# Patient Record
Sex: MALE | Race: WHITE | Employment: FULL TIME | URBAN - METROPOLITAN AREA
[De-identification: names, ages, dates, MRNs, and addresses within clinical notes are randomized per-mention and may not be internally consistent; named-entity substitution may affect disease eponyms.]

---

## 2020-11-04 ENCOUNTER — HOSPITAL ENCOUNTER (INPATIENT)
Age: 45
LOS: 1 days | Discharge: HOME | DRG: 177 | End: 2020-11-05
Attending: EMERGENCY MEDICINE | Admitting: INTERNAL MEDICINE
Payer: COMMERCIAL

## 2020-11-04 ENCOUNTER — APPOINTMENT (OUTPATIENT)
Dept: GENERAL RADIOLOGY | Age: 45
DRG: 177 | End: 2020-11-04
Payer: COMMERCIAL

## 2020-11-04 PROBLEM — R51.9 HEADACHE: Status: ACTIVE | Noted: 2020-11-04

## 2020-11-04 PROBLEM — Z90.5 ACQUIRED SOLITARY KIDNEY: Status: ACTIVE | Noted: 2020-11-04

## 2020-11-04 PROBLEM — R19.7 DIARRHEA: Status: ACTIVE | Noted: 2020-11-04

## 2020-11-04 PROBLEM — U07.1 COVID-19 VIRUS INFECTION: Status: ACTIVE | Noted: 2020-11-04

## 2020-11-04 PROBLEM — R11.2 NAUSEA & VOMITING: Status: ACTIVE | Noted: 2020-11-04

## 2020-11-04 PROBLEM — Z90.5 SOLITARY KIDNEY, ACQUIRED: Status: ACTIVE | Noted: 2020-11-04

## 2020-11-04 PROBLEM — N30.00 ACUTE CYSTITIS WITHOUT HEMATURIA: Status: ACTIVE | Noted: 2020-11-04

## 2020-11-04 PROBLEM — Q60.0 SOLITARY KIDNEY, CONGENITAL: Status: ACTIVE | Noted: 2020-11-04

## 2020-11-04 PROBLEM — R06.02 SHORTNESS OF BREATH: Status: ACTIVE | Noted: 2020-11-04

## 2020-11-04 PROBLEM — R05.9 COUGH: Status: ACTIVE | Noted: 2020-11-04

## 2020-11-04 PROBLEM — E11.9 NEW ONSET TYPE 2 DIABETES MELLITUS (HCC): Status: ACTIVE | Noted: 2020-11-04

## 2020-11-04 LAB
ABO/RH: NORMAL
ANION GAP SERPL CALCULATED.3IONS-SCNC: 15 MMOL/L (ref 3–16)
ANTIBODY SCREEN: NORMAL
APTT: 31.5 SEC (ref 24.2–36.2)
APTT: 32.6 SEC (ref 24.2–36.2)
BACTERIA: ABNORMAL /HPF
BASOPHILS ABSOLUTE: 0 K/UL (ref 0–0.2)
BASOPHILS RELATIVE PERCENT: 0.4 %
BILIRUBIN URINE: NEGATIVE
BLOOD, URINE: ABNORMAL
BUN BLDV-MCNC: 15 MG/DL (ref 7–20)
C-REACTIVE PROTEIN: 111.2 MG/L (ref 0–5.1)
CALCIUM SERPL-MCNC: 9.1 MG/DL (ref 8.3–10.6)
CHLORIDE BLD-SCNC: 93 MMOL/L (ref 99–110)
CLARITY: ABNORMAL
CO2: 21 MMOL/L (ref 21–32)
COLOR: YELLOW
CREAT SERPL-MCNC: 1.1 MG/DL (ref 0.9–1.3)
D DIMER: 215 NG/ML DDU (ref 0–229)
D DIMER: <200 NG/ML DDU (ref 0–229)
EOSINOPHILS ABSOLUTE: 0 K/UL (ref 0–0.6)
EOSINOPHILS RELATIVE PERCENT: 0.4 %
EPITHELIAL CELLS, UA: 0 /HPF (ref 0–5)
FERRITIN: 1414 NG/ML (ref 30–400)
FIBRINOGEN: 764 MG/DL (ref 200–397)
FIBRINOGEN: 764 MG/DL (ref 200–397)
GFR AFRICAN AMERICAN: >60
GFR NON-AFRICAN AMERICAN: >60
GLUCOSE BLD-MCNC: 329 MG/DL (ref 70–99)
GLUCOSE BLD-MCNC: 396 MG/DL (ref 70–99)
GLUCOSE BLD-MCNC: 430 MG/DL (ref 70–99)
GLUCOSE BLD-MCNC: 531 MG/DL (ref 70–99)
GLUCOSE URINE: >=1000 MG/DL
HCT VFR BLD CALC: 41.5 % (ref 40.5–52.5)
HEMOGLOBIN: 14.3 G/DL (ref 13.5–17.5)
HYALINE CASTS: 4 /LPF (ref 0–8)
INR BLD: 0.99 (ref 0.86–1.14)
INR BLD: 1 (ref 0.86–1.14)
KETONES, URINE: 15 MG/DL
LACTATE DEHYDROGENASE: 253 U/L (ref 100–190)
LACTIC ACID: 1.7 MMOL/L (ref 0.4–2)
LEUKOCYTE ESTERASE, URINE: NEGATIVE
LYMPHOCYTES ABSOLUTE: 0.7 K/UL (ref 1–5.1)
LYMPHOCYTES RELATIVE PERCENT: 22.4 %
MCH RBC QN AUTO: 31.4 PG (ref 26–34)
MCHC RBC AUTO-ENTMCNC: 34.5 G/DL (ref 31–36)
MCV RBC AUTO: 90.9 FL (ref 80–100)
MICROSCOPIC EXAMINATION: YES
MONOCYTES ABSOLUTE: 0.3 K/UL (ref 0–1.3)
MONOCYTES RELATIVE PERCENT: 8.4 %
NEUTROPHILS ABSOLUTE: 2.2 K/UL (ref 1.7–7.7)
NEUTROPHILS RELATIVE PERCENT: 68.4 %
NITRITE, URINE: POSITIVE
PDW BLD-RTO: 13.5 % (ref 12.4–15.4)
PERFORMED ON: ABNORMAL
PH UA: 5.5 (ref 5–8)
PLATELET # BLD: 95 K/UL (ref 135–450)
PLATELET SLIDE REVIEW: ABNORMAL
PMV BLD AUTO: 9.9 FL (ref 5–10.5)
POTASSIUM REFLEX MAGNESIUM: 4.5 MMOL/L (ref 3.5–5.1)
PROCALCITONIN: 0.13 NG/ML (ref 0–0.15)
PROTEIN UA: 100 MG/DL
PROTHROMBIN TIME: 11.5 SEC (ref 10–13.2)
PROTHROMBIN TIME: 11.6 SEC (ref 10–13.2)
RBC # BLD: 4.57 M/UL (ref 4.2–5.9)
RBC UA: 2 /HPF (ref 0–4)
SARS-COV-2, NAAT: DETECTED
SLIDE REVIEW: ABNORMAL
SODIUM BLD-SCNC: 129 MMOL/L (ref 136–145)
SPECIFIC GRAVITY UA: >1.03 (ref 1–1.03)
TROPONIN: <0.01 NG/ML
URINE TYPE: ABNORMAL
UROBILINOGEN, URINE: 0.2 E.U./DL
WBC # BLD: 3.1 K/UL (ref 4–11)
WBC UA: 99 /HPF (ref 0–5)

## 2020-11-04 PROCEDURE — 80048 BASIC METABOLIC PNL TOTAL CA: CPT

## 2020-11-04 PROCEDURE — 85025 COMPLETE CBC W/AUTO DIFF WBC: CPT

## 2020-11-04 PROCEDURE — 83036 HEMOGLOBIN GLYCOSYLATED A1C: CPT

## 2020-11-04 PROCEDURE — 2500000003 HC RX 250 WO HCPCS: Performed by: INTERNAL MEDICINE

## 2020-11-04 PROCEDURE — 84484 ASSAY OF TROPONIN QUANT: CPT

## 2020-11-04 PROCEDURE — 86900 BLOOD TYPING SEROLOGIC ABO: CPT

## 2020-11-04 PROCEDURE — 2580000003 HC RX 258: Performed by: EMERGENCY MEDICINE

## 2020-11-04 PROCEDURE — 1200000000 HC SEMI PRIVATE

## 2020-11-04 PROCEDURE — 82728 ASSAY OF FERRITIN: CPT

## 2020-11-04 PROCEDURE — 2580000003 HC RX 258: Performed by: INTERNAL MEDICINE

## 2020-11-04 PROCEDURE — 85384 FIBRINOGEN ACTIVITY: CPT

## 2020-11-04 PROCEDURE — 86901 BLOOD TYPING SEROLOGIC RH(D): CPT

## 2020-11-04 PROCEDURE — 85610 PROTHROMBIN TIME: CPT

## 2020-11-04 PROCEDURE — 85730 THROMBOPLASTIN TIME PARTIAL: CPT

## 2020-11-04 PROCEDURE — 6370000000 HC RX 637 (ALT 250 FOR IP): Performed by: INTERNAL MEDICINE

## 2020-11-04 PROCEDURE — 71045 X-RAY EXAM CHEST 1 VIEW: CPT

## 2020-11-04 PROCEDURE — U0002 COVID-19 LAB TEST NON-CDC: HCPCS

## 2020-11-04 PROCEDURE — 86850 RBC ANTIBODY SCREEN: CPT

## 2020-11-04 PROCEDURE — 86140 C-REACTIVE PROTEIN: CPT

## 2020-11-04 PROCEDURE — 6360000002 HC RX W HCPCS: Performed by: EMERGENCY MEDICINE

## 2020-11-04 PROCEDURE — 83605 ASSAY OF LACTIC ACID: CPT

## 2020-11-04 PROCEDURE — 6360000002 HC RX W HCPCS: Performed by: INTERNAL MEDICINE

## 2020-11-04 PROCEDURE — 87040 BLOOD CULTURE FOR BACTERIA: CPT

## 2020-11-04 PROCEDURE — 99285 EMERGENCY DEPT VISIT HI MDM: CPT

## 2020-11-04 PROCEDURE — 84145 PROCALCITONIN (PCT): CPT

## 2020-11-04 PROCEDURE — 96374 THER/PROPH/DIAG INJ IV PUSH: CPT

## 2020-11-04 PROCEDURE — 85379 FIBRIN DEGRADATION QUANT: CPT

## 2020-11-04 PROCEDURE — 81001 URINALYSIS AUTO W/SCOPE: CPT

## 2020-11-04 PROCEDURE — 36415 COLL VENOUS BLD VENIPUNCTURE: CPT

## 2020-11-04 PROCEDURE — 83615 LACTATE (LD) (LDH) ENZYME: CPT

## 2020-11-04 PROCEDURE — 6370000000 HC RX 637 (ALT 250 FOR IP): Performed by: EMERGENCY MEDICINE

## 2020-11-04 RX ORDER — ALBUTEROL SULFATE 90 UG/1
2 AEROSOL, METERED RESPIRATORY (INHALATION) EVERY 6 HOURS PRN
Status: DISCONTINUED | OUTPATIENT
Start: 2020-11-04 | End: 2020-11-05 | Stop reason: HOSPADM

## 2020-11-04 RX ORDER — ASCORBIC ACID 500 MG
1000 TABLET ORAL 3 TIMES DAILY
Status: DISCONTINUED | OUTPATIENT
Start: 2020-11-04 | End: 2020-11-05 | Stop reason: HOSPADM

## 2020-11-04 RX ORDER — SODIUM CHLORIDE 9 MG/ML
INJECTION, SOLUTION INTRAVENOUS CONTINUOUS
Status: DISCONTINUED | OUTPATIENT
Start: 2020-11-04 | End: 2020-11-05 | Stop reason: HOSPADM

## 2020-11-04 RX ORDER — POLYETHYLENE GLYCOL 3350 17 G/17G
17 POWDER, FOR SOLUTION ORAL DAILY PRN
Status: DISCONTINUED | OUTPATIENT
Start: 2020-11-04 | End: 2020-11-05 | Stop reason: HOSPADM

## 2020-11-04 RX ORDER — ZINC SULFATE 50(220)MG
50 CAPSULE ORAL DAILY
Status: DISCONTINUED | OUTPATIENT
Start: 2020-11-04 | End: 2020-11-05 | Stop reason: HOSPADM

## 2020-11-04 RX ORDER — BENZONATATE 100 MG/1
100 CAPSULE ORAL ONCE
Status: COMPLETED | OUTPATIENT
Start: 2020-11-04 | End: 2020-11-04

## 2020-11-04 RX ORDER — ASPIRIN 325 MG
325 TABLET ORAL ONCE
Status: COMPLETED | OUTPATIENT
Start: 2020-11-04 | End: 2020-11-04

## 2020-11-04 RX ORDER — DEXAMETHASONE SODIUM PHOSPHATE 10 MG/ML
10 INJECTION, SOLUTION INTRAMUSCULAR; INTRAVENOUS ONCE
Status: COMPLETED | OUTPATIENT
Start: 2020-11-04 | End: 2020-11-04

## 2020-11-04 RX ORDER — DEXTROSE MONOHYDRATE 25 G/50ML
12.5 INJECTION, SOLUTION INTRAVENOUS PRN
Status: DISCONTINUED | OUTPATIENT
Start: 2020-11-04 | End: 2020-11-05 | Stop reason: HOSPADM

## 2020-11-04 RX ORDER — ACETAMINOPHEN 650 MG/1
650 SUPPOSITORY RECTAL EVERY 4 HOURS PRN
Status: DISCONTINUED | OUTPATIENT
Start: 2020-11-04 | End: 2020-11-05 | Stop reason: HOSPADM

## 2020-11-04 RX ORDER — SODIUM CHLORIDE, SODIUM LACTATE, POTASSIUM CHLORIDE, AND CALCIUM CHLORIDE .6; .31; .03; .02 G/100ML; G/100ML; G/100ML; G/100ML
1000 INJECTION, SOLUTION INTRAVENOUS ONCE
Status: COMPLETED | OUTPATIENT
Start: 2020-11-04 | End: 2020-11-04

## 2020-11-04 RX ORDER — SODIUM CHLORIDE 0.9 % (FLUSH) 0.9 %
10 SYRINGE (ML) INJECTION PRN
Status: DISCONTINUED | OUTPATIENT
Start: 2020-11-04 | End: 2020-11-05 | Stop reason: HOSPADM

## 2020-11-04 RX ORDER — NICOTINE POLACRILEX 4 MG
15 LOZENGE BUCCAL PRN
Status: DISCONTINUED | OUTPATIENT
Start: 2020-11-04 | End: 2020-11-05 | Stop reason: HOSPADM

## 2020-11-04 RX ORDER — SODIUM CHLORIDE 0.9 % (FLUSH) 0.9 %
10 SYRINGE (ML) INJECTION EVERY 12 HOURS SCHEDULED
Status: DISCONTINUED | OUTPATIENT
Start: 2020-11-04 | End: 2020-11-05 | Stop reason: HOSPADM

## 2020-11-04 RX ORDER — ACETAMINOPHEN 325 MG/1
650 TABLET ORAL EVERY 4 HOURS PRN
Status: DISCONTINUED | OUTPATIENT
Start: 2020-11-04 | End: 2020-11-05 | Stop reason: HOSPADM

## 2020-11-04 RX ORDER — ONDANSETRON 2 MG/ML
4 INJECTION INTRAMUSCULAR; INTRAVENOUS EVERY 4 HOURS PRN
Status: DISCONTINUED | OUTPATIENT
Start: 2020-11-04 | End: 2020-11-05 | Stop reason: HOSPADM

## 2020-11-04 RX ORDER — THIAMINE HYDROCHLORIDE 100 MG/ML
200 INJECTION, SOLUTION INTRAMUSCULAR; INTRAVENOUS DAILY
Status: DISCONTINUED | OUTPATIENT
Start: 2020-11-04 | End: 2020-11-04 | Stop reason: SDUPTHER

## 2020-11-04 RX ORDER — BENZONATATE 100 MG/1
200 CAPSULE ORAL 3 TIMES DAILY PRN
Status: DISCONTINUED | OUTPATIENT
Start: 2020-11-04 | End: 2020-11-05 | Stop reason: HOSPADM

## 2020-11-04 RX ORDER — DEXTROSE MONOHYDRATE 50 MG/ML
100 INJECTION, SOLUTION INTRAVENOUS PRN
Status: DISCONTINUED | OUTPATIENT
Start: 2020-11-04 | End: 2020-11-05 | Stop reason: HOSPADM

## 2020-11-04 RX ADMIN — ASPIRIN 325 MG ORAL TABLET 325 MG: 325 PILL ORAL at 10:17

## 2020-11-04 RX ADMIN — INSULIN LISPRO 6 UNITS: 100 INJECTION, SOLUTION INTRAVENOUS; SUBCUTANEOUS at 22:17

## 2020-11-04 RX ADMIN — BENZONATATE 100 MG: 100 CAPSULE ORAL at 10:17

## 2020-11-04 RX ADMIN — BENZONATATE 200 MG: 100 CAPSULE ORAL at 22:29

## 2020-11-04 RX ADMIN — OXYCODONE HYDROCHLORIDE AND ACETAMINOPHEN 1000 MG: 500 TABLET ORAL at 13:20

## 2020-11-04 RX ADMIN — FAMOTIDINE 10 MG: 10 INJECTION, SOLUTION INTRAVENOUS at 22:30

## 2020-11-04 RX ADMIN — THIAMINE HYDROCHLORIDE 200 MG: 100 INJECTION, SOLUTION INTRAMUSCULAR; INTRAVENOUS at 13:19

## 2020-11-04 RX ADMIN — ZINC SULFATE 220 MG (50 MG) CAPSULE 50 MG: CAPSULE at 13:20

## 2020-11-04 RX ADMIN — OXYCODONE HYDROCHLORIDE AND ACETAMINOPHEN 1000 MG: 500 TABLET ORAL at 22:30

## 2020-11-04 RX ADMIN — SODIUM CHLORIDE, POTASSIUM CHLORIDE, SODIUM LACTATE AND CALCIUM CHLORIDE 1000 ML: 600; 310; 30; 20 INJECTION, SOLUTION INTRAVENOUS at 10:45

## 2020-11-04 RX ADMIN — DEXAMETHASONE SODIUM PHOSPHATE 10 MG: 10 INJECTION, SOLUTION INTRAMUSCULAR; INTRAVENOUS at 10:17

## 2020-11-04 RX ADMIN — INSULIN LISPRO 8 UNITS: 100 INJECTION, SOLUTION INTRAVENOUS; SUBCUTANEOUS at 14:23

## 2020-11-04 RX ADMIN — SODIUM CHLORIDE: 9 INJECTION, SOLUTION INTRAVENOUS at 14:19

## 2020-11-04 RX ADMIN — ENOXAPARIN SODIUM 30 MG: 30 INJECTION SUBCUTANEOUS at 22:29

## 2020-11-04 RX ADMIN — ACETAMINOPHEN 650 MG: 325 TABLET ORAL at 22:30

## 2020-11-04 RX ADMIN — ACETAMINOPHEN 650 MG: 325 TABLET ORAL at 13:20

## 2020-11-04 RX ADMIN — SODIUM CHLORIDE: 9 INJECTION, SOLUTION INTRAVENOUS at 22:35

## 2020-11-04 RX ADMIN — INSULIN LISPRO 12 UNITS: 100 INJECTION, SOLUTION INTRAVENOUS; SUBCUTANEOUS at 18:04

## 2020-11-04 RX ADMIN — FAMOTIDINE 10 MG: 10 INJECTION, SOLUTION INTRAVENOUS at 13:19

## 2020-11-04 RX ADMIN — ENOXAPARIN SODIUM 30 MG: 30 INJECTION SUBCUTANEOUS at 13:20

## 2020-11-04 RX ADMIN — CEFTRIAXONE 1 G: 1 INJECTION, POWDER, FOR SOLUTION INTRAMUSCULAR; INTRAVENOUS at 12:13

## 2020-11-04 ASSESSMENT — PAIN DESCRIPTION - PAIN TYPE
TYPE: ACUTE PAIN

## 2020-11-04 ASSESSMENT — PAIN SCALES - GENERAL
PAINLEVEL_OUTOF10: 10
PAINLEVEL_OUTOF10: 5
PAINLEVEL_OUTOF10: 5

## 2020-11-04 ASSESSMENT — PAIN DESCRIPTION - DESCRIPTORS
DESCRIPTORS: ACHING

## 2020-11-04 ASSESSMENT — PAIN DESCRIPTION - LOCATION
LOCATION: HEAD

## 2020-11-04 ASSESSMENT — PAIN DESCRIPTION - FREQUENCY: FREQUENCY: CONTINUOUS

## 2020-11-04 NOTE — H&P
Hospital Medicine  History and Physical    PCP: No primary care provider on file. Patient Name: Sol Rapp    Date of Service: Pt seen/examined on 11/04/2020 and admitted to Inpatient with expected LOS greater than two midnights due to medical therapy    CHIEF COMPLAINT:  Pt c/o cough, headache, dizziness, fevers, nausea, vomiting and diarrhea  HISTORY OF PRESENT ILLNESS: Pt is an 41y.o. year-old male who is generally in good health. He has a solitary kidney due to having a kidney removed due to a solid mass as a child. He was on a recent camping trip and states that several other people on the trip contracted COVID-19. Resents to the emergency room for evaluation following a 9-day history of shortness of breath and a productive cough. Also reports nausea, vomiting, diarrhea, fevers and myalgias. In the emergency room he was found to have COVID-19 and new onset Diabetes Mellitus. He is being admitted for further evaluation and treatment. Associated signs and symptoms do not include hemoptysis, hematochezia, constipation or urinary symptoms. Past Medical History:        Diagnosis Date    Hypertension        Past Surgical History:        Procedure Laterality Date    TURP         Allergies:  Patient has no known allergies. Medications Prior to Admission:    Prior to Admission medications    Not on File       Family History:   Family history is negative for accelerated coronary artery disease, diabetes or malignancies. Social History:   TOBACCO:   does not have a smoking history on file. His smokeless tobacco use includes chew. ETOH:   reports current alcohol use.   OCCUPATION:      REVIEW OF SYSTEMS:  A full review of systems was performed and is negative except for that which appears in the HPI    Physical Exam:    Vitals: /89   Pulse 79   Temp 98.7 °F (37.1 °C) (Oral)   Resp 25   Ht 6' 1\" (1.854 m)   Wt 245 lb (111.1 kg)   SpO2 97%   BMI 32.32 kg/m²   General appearance: WD/WN 45 y.o. year-old  male who is alert, appears stated age and is cooperative  HEENT: Head: Normocephalic, no lesions, without obvious abnormality. Eye: Normal external eye, conjunctiva, lids cornea, PEERL. Ears: Normal external ears. Non-tender. Nose: Normal external nose, mucus membranes and septum. Pharynx: Dental Hygiene adequate. Normal buccal mucosa. Normal pharynx. Neck: no adenopathy, no carotid bruit, no JVD, supple, symmetrical, trachea midline and thyroid not enlarged, symmetric, no tenderness/mass/nodules  Lungs: clear to auscultation bilaterally and no use of accessory muscles. Heart: regular rate and rhythm, S1, S2 normal, no murmur, click, rub or gallop and normal apical impulse  Abdomen: soft, non-tender; bowel sounds normal; no masses, no organomegaly  Extremities: extremities atraumatic, no cyanosis or edema and Homans sign is negative, no sign of DVT. Capillary Refill: Acceptable < 3 seconds   Peripheral Pulses: +3 easily felt, not easily obliterated with pressures   Skin: Skin color, texture, turgor normal. No rashes or lesions on exposed skin  Neurologic: Neurovascularly intact without any focal sensory/motor deficits. Cranial nerves: II-XII intact, grossly non-focal. Gait was not tested. Psychiatric: Alert and oriented, thought content appropriate, normal insight    CBC:   Recent Labs     11/04/20  0954   WBC 3.1*   HGB 14.3   PLT 95*     BMP:    Recent Labs     11/04/20  0954   *   K 4.5   CL 93*   CO2 21   BUN 15   CREATININE 1.1   GLUCOSE 396*     Troponin: No results for input(s): TROPONINI in the last 72 hours.   PT/INR:  No results found for: PTINR  U/A:    Lab Results   Component Value Date    LEUKOCYTESUR Negative 11/04/2020    RBCUA 2 11/04/2020    SPECGRAV >1.030 11/04/2020    UROBILINOGEN 0.2 11/04/2020    BILIRUBINUR Negative 11/04/2020    BLOODU TRACE 11/04/2020    GLUCOSEU >=1000 11/04/2020    PROTEINU 100 11/04/2020         RAD:   I have independently reviewed and interpreted the imaging studies below and based my recommendations to the patient on those findings. Xr Chest Portable    Result Date: 11/4/2020  EXAMINATION: ONE XRAY VIEW OF THE CHEST 11/4/2020 9:34 am COMPARISON: None. HISTORY: ORDERING SYSTEM PROVIDED HISTORY: sob TECHNOLOGIST PROVIDED HISTORY: Reason for exam:->sob Reason for Exam: Shortness of Breath Acuity: Acute Type of Exam: Initial FINDINGS: Ill-defined faint left mid to lower lung pulmonary disease is present. No evidence of pneumothorax or pleural effusion. No evidence of acute process of the cardiac or mediastinal structures     Left-sided airspace disease suspicious for pneumonia. Assessment:   Principal Problem:    COVID-19 virus infection  Active Problems:    New onset type 2 diabetes mellitus (HCC)    Acute cystitis without hematuria    Nausea & vomiting    Diarrhea    Cough    Headache    Solitary kidney, acquired    Shortness of breath  Resolved Problems:    * No resolved hospital problems. *      Plan:     Pneumonia due to 2019 novel coronavirus  - Blood cultures x 2 ordered  - Respiratory culture ordered  - Legionella pneumophilia and Strep pneumoniae urine antigens ordered  - Procalcitonin ordered  - Decadron not continued as patient is not hypoxic  - COVID19 dosed Lovenox started  - Zinc sulfate 50 mg bid ordered  - Vitamin D (Cholecalciferol) 2000 units daily ordered  - Vitamin C 1000 mg tid ordered  - Thiamine 200 mg IV daily ordered  - Pepcid 10 mg IV bid  - Lovenox bid ordered     Cough, Shortness of breath (without hypoxia) - will provide antitussive medications as needed. Acute cystitis without significant hematuria - patient was started on Rocephin empirically. Urine culture and sensitivities have been ordered, and antibiotic therapy will be adjusted as necessary based upon those results.      Non-Intractable vomiting with nausea - Will provide symptomatic treatment with Zofran and/or Phenergan as needed, maintenance of fluids and electrolytes. Diarrhea - Stool studes ordered to include bacterial pathogens via PCR. Will monitor and provide supportive care. Headache - provide symptomatic treatment    Solitary kidney, acquired      Diabetes mellitus, new onset ([facility] [address] - HgbA1c ordered; Start Sliding Scale insulin, and patient will be placed on a Carb Control Diet. Monitor glucose qAc and qHs, or q4 hours when NPO. The Diabetic Educator will be consulted. DVT Prophylaxis: Lovenox  Diet: DIET CARB CONTROL;  Code Status: No Order  (Advanced care planning has been discussed with patient and/or responsible family member and is reflected in the code status.  Further orders associated with this have been entered if appropriate)    Disposition: Anticipate that patient will remain in the hospital for 2 to 3 days depending on further evaluation and clinical course    Please note that over 50 minutes was spent in evaluating the patient, review of records and results, discussion with staff/family, etc.    India Ryan MD

## 2020-11-04 NOTE — ED PROVIDER NOTES
Emergency Department Encounter    Patient: Marlene Spangler  MRN: [id number]  : 1975  Date of Evaluation: 2020  ED Provider:  Danielle Berg    Triage Chief Complaint:   Shortness of Breath (x 9 days. Productive yellow cough, headache, dizziness, fevers at home, nausea, diarrhea. Denies vomiting.)    [place]:  Marlene Spangler is a 41 y.o. male that presents the ER for evaluation positive cough congestion shortness of breath and productive sputum. He has a solitary kidney due to prior history of nephrectomy due to mass lesion as a child. Multiple sick contacts. Positive cough congestion fevers myalgia and diarrhea. No abdominal pain. Mild headache. Positive cough congestion shortness of breath. Concern for possible COVID-19. ROS - see HPI, below listed is current ROS at time of my eval:  General:  No fevers, no chills  Eyes:  No recent vison changes, no discharge  ENT:  No sore throat, no nasal congestion, no hearing changes  Cardiovascular:  No chest pain, no palpitations  Respiratory:  + shortness of breath, + cough, + wheezing  Gastrointestinal:  No pain, no nausea, no vomiting, no diarrhea  Musculoskeletal:  No muscle pain, no joint pain  Skin:  No rash, no pruritis  Neurologic:  No speech problems, no headache  Psychiatric:  No anxiety  Genitourinary:  No dysuria, no hematuria  Endocrine:  No unexpected weight gain, no unexpected weight loss  Extremities:  no edema, no pain    Past Medical History:   Diagnosis Date    Hypertension      Past Surgical History:   Procedure Laterality Date    TURP       History reviewed. No pertinent family history.   Social History     Socioeconomic History    Marital status:      Spouse name: Not on file    Number of children: Not on file    Years of education: Not on file    Highest education level: Not on file   Occupational History    Not on file   Social Needs    Financial resource strain: Not on file    Food insecurity     Worry: Not on file     Inability: Not on file    Transportation needs     Medical: Not on file     Non-medical: Not on file   Tobacco Use    Smoking status: Never Smoker    Smokeless tobacco: Current User     Types: Chew   Substance and Sexual Activity    Alcohol use: Yes     Comment: once/month    Drug use: Not on file    Sexual activity: Not on file   Lifestyle    Physical activity     Days per week: Not on file     Minutes per session: Not on file    Stress: Not on file   Relationships    Social connections     Talks on phone: Not on file     Gets together: Not on file     Attends [employer] service: Not on file     Active member of club or organization: Not on file     Attends meetings of clubs or organizations: Not on file     Relationship status: Not on file    Intimate partner violence     Fear of current or ex partner: Not on file     Emotionally abused: Not on file     Physically abused: Not on file     Forced sexual activity: Not on file   Other Topics Concern    Not on file   Social History Narrative    Not on file     Current Facility-Administered Medications   Medication Dose Route Frequency Provider Last Rate Last Dose    insulin glargine (LANTUS) injection vial 8 Units  8 Units Subcutaneous Nightly Jaden Lopez MD   8 Units at 11/05/20 1235    cefTRIAXone (ROCEPHIN) 1 g IVPB in 50 mL D5W minibag  1 g Intravenous Q24H Jaden Lopez MD   Stopped at 11/05/20 1305    sodium chloride flush 0.9 % injection 10 mL  10 mL Intravenous 2 times per day Jaden Lopez MD        sodium chloride flush 0.9 % injection 10 mL  10 mL Intravenous PRN Jaden Lopez MD        acetaminophen (TYLENOL) tablet 650 mg  650 mg Oral Q4H PRN Jaden Lopez MD   650 mg at 11/05/20 0545    Or    acetaminophen (TYLENOL) suppository 650 mg  650 mg Rectal Q4H PRN Jaden Lopez MD        polyethylene glycol [facility]) packet 17 g  17 g Oral Daily PRN Jaden Lopez MD        ondansetron [facility]) injection 4 mg  4 mg Intravenous Q4H PRN Robert Webb MD        insulin lispro (HUMALOG) injection vial 0-12 Units  0-12 Units Subcutaneous TID WC Robert Webb MD   10 Units at 11/05/20 1235    insulin lispro (HUMALOG) injection vial 0-6 Units  0-6 Units Subcutaneous Nightly Robert Webb MD   6 Units at 11/04/20 2217    glucose (GLUTOSE) 40 % oral gel 15 g  15 g Oral PRN Robert Webb MD        dextrose 50 % IV solution  12.5 g Intravenous PRN Robert Webb MD        glucagon (rDNA) injection 1 mg  1 mg Intramuscular PRN Robert Webb MD        dextrose 5 % solution  100 mL/hr Intravenous PRN Robert Webb MD        0.9 % sodium chloride infusion   Intravenous Continuous Robert Webb  mL/hr at 11/05/20 0821      enoxaparin (LOVENOX) injection 30 mg  30 mg Subcutaneous BID Robert Webb MD   30 mg at 11/05/20 0858    albuterol sulfate  (90 Base) MCG/ACT inhaler 2 puff  2 puff Inhalation Q6H PRN Robert Webb MD        benzonatate (TESSALON) capsule 200 mg  200 mg Oral TID PRN Robert Webb MD   200 mg at 11/04/20 2229    famotidine (PEPCID) injection 10 mg  10 mg Intravenous BID Robert Webb MD   10 mg at 11/05/20 8128    vitamin C (ASCORBIC ACID) tablet 1,000 mg  1,000 mg Oral TID Robert Webb MD   1,000 mg at 11/05/20 4490    zinc sulfate (ZINCATE) capsule 50 mg  50 mg Oral Daily Robert Webb MD   50 mg at 11/05/20 5835    thiamine (B-1) 200 mg in sodium chloride 0.9 % 100 mL IVPB  200 mg Intravenous Q24H Robert Webb MD   Stopped at 11/04/20 1349     No Known Allergies    Nursing Notes Reviewed    Physical Exam:  Triage VS:    ED Triage Vitals [11/04/20 0906]   Enc Vitals Group      BP (!) 133/90      Pulse 87      Resp 16      Temp 98.7 °F (37.1 °C)      Temp Source Oral      SpO2 100 %      Weight 245 lb (111.1 kg)      Height 6' 1\" (1.854 m)      Head Circumference       Peak Flow       Pain Score       Pain Loc       Pain Edu? Excl.  in [address]? My pulse ox interpretation is - normal    General appearance:  No acute distress  Skin:  Warm. Dry. No pallor. No rash. Eye:  Normal conjuctiva. no Icterus. Ears, nose, mouth and throat:  Oral mucosa moist   Heart:  Regular rate and rhythm, normal S1 & S2, no extra heart sounds, no murmurs. Perfusion:  Within normal limits. Respiratory:  Respirations nonlabored. expiratory wheezes noted, good air entry throughout, no tachypnea     Abdominal:  Soft. Nontender. Non distended. Extremity:  No edema or tenderness  Neurological:  Alert and oriented,  No focal neuro deficits.      I have reviewed and interpreted all of the currently available lab results from this visit (if applicable):  Results for orders placed or performed during the hospital encounter of 11/04/20   COVID-19   Result Value Ref Range    SARS-CoV-2, NAAT DETECTED (A) Not Detected   Basic Metabolic Panel w/ Reflex to MG   Result Value Ref Range    Sodium 129 (L) 136 - 145 mmol/L    Potassium reflex Magnesium 4.5 3.5 - 5.1 mmol/L    Chloride 93 (L) 99 - 110 mmol/L    CO2 21 21 - 32 mmol/L    Anion Gap 15 3 - 16    Glucose 396 (H) 70 - 99 mg/dL    BUN 15 7 - 20 mg/dL    CREATININE 1.1 0.9 - 1.3 mg/dL    GFR Non-African American >60 >60    GFR African American >60 >60    Calcium 9.1 8.3 - 10.6 mg/dL   CBC Auto Differential   Result Value Ref Range    WBC 3.1 (L) 4.0 - 11.0 K/uL    RBC 4.57 4.20 - 5.90 M/uL    Hemoglobin 14.3 13.5 - 17.5 g/dL    Hematocrit 41.5 40.5 - 52.5 %    MCV 90.9 80.0 - 100.0 fL    MCH 31.4 26.0 - 34.0 pg    MCHC 34.5 31.0 - 36.0 g/dL    RDW 13.5 12.4 - 15.4 %    Platelets 95 (L) 121 - 450 K/uL    MPV 9.9 5.0 - 10.5 fL    PLATELET SLIDE REVIEW Decreased     SLIDE REVIEW see below     Neutrophils % 68.4 %    Lymphocytes % 22.4 %    Monocytes % 8.4 %    Eosinophils % 0.4 %    Basophils % 0.4 %    Neutrophils Absolute 2.2 1.7 - 7.7 K/uL    Lymphocytes Absolute 0.7 (L) 1.0 - 5.1 K/uL    Monocytes Absolute 0.3 0.0 - 1.3 K/uL    Eosinophils Absolute 0.0 0.0 - 0.6 K/uL    Basophils Absolute 0.0 0.0 - 0.2 K/uL   Urinalysis, reflex to microscopic   Result Value Ref Range    Color, UA YELLOW Straw/Yellow    Clarity, UA CLOUDY (A) Clear    Glucose, Ur >=1000 (A) Negative mg/dL    Bilirubin Urine Negative Negative    Ketones, Urine 15 (A) Negative mg/dL    Specific Gravity, UA >1.030 1.005 - 1.030    Blood, Urine TRACE (A) Negative    pH, UA 5.5 5.0 - 8.0    Protein,  (A) Negative mg/dL    Urobilinogen, Urine 0.2 <2.0 E.U./dL    Nitrite, Urine POSITIVE (A) Negative    Leukocyte Esterase, Urine Negative Negative    Microscopic Examination YES     Urine Type NotGiven    Microscopic Urinalysis   Result Value Ref Range    Bacteria, UA 4+ (A) None Seen /HPF    Hyaline Casts, UA 4 0 - 8 /LPF    WBC, UA 99 (H) 0 - 5 /HPF    RBC, UA 2 0 - 4 /HPF    Epithelial Cells, UA 0 0 - 5 /HPF   Hemoglobin A1C   Result Value Ref Range    Hemoglobin A1C 9.7 See comment %    eAG 231.7 mg/dL   Protime-INR   Result Value Ref Range    Protime 11.6 10.0 - 13.2 sec    INR 1.00 0.86 - 1.14   APTT   Result Value Ref Range    aPTT 32.6 24.2 - 36.2 sec   Fibrinogen   Result Value Ref Range    Fibrinogen 764 (H) 200 - 397 mg/dL   Procalcitonin   Result Value Ref Range    Procalcitonin 0.13 0.00 - 0.15 ng/mL   C-Reactive Protein   Result Value Ref Range    .2 (H) 0.0 - 5.1 mg/L   Lactate Dehydrogenase   Result Value Ref Range     (H) 100 - 190 U/L   Ferritin   Result Value Ref Range    Ferritin 1,414.0 (H) 30.0 - 400.0 ng/mL   D-Dimer, Quantitative   Result Value Ref Range    D-Dimer, Quant <200 0 - 229 ng/mL DDU   Troponin   Result Value Ref Range    Troponin <0.01 <0.01 ng/mL   Lactic Acid, Plasma   Result Value Ref Range    Lactic Acid 1.7 0.4 - 2.0 mmol/L   CBC auto differential   Result Value Ref Range    WBC 7.0 4.0 - 11.0 K/uL    RBC 4.48 4.20 - 5.90 M/uL    Hemoglobin 14.2 13.5 - 17.5 g/dL    Hematocrit 40.7 40.5 - 52.5 %    MCV 90.8 80.0 - 100.0 fL    MCH 31.6 26.0 - 34.0 pg    MCHC 34.8 31.0 - 36.0 g/dL    RDW 13.7 12.4 - 15.4 %    Platelets 623 (L) 297 - 450 K/uL    MPV 9.9 5.0 - 10.5 fL    Neutrophils % 83.7 %    Lymphocytes % 10.7 %    Monocytes % 5.5 %    Eosinophils % 0.0 %    Basophils % 0.1 %    Neutrophils Absolute 5.9 1.7 - 7.7 K/uL    Lymphocytes Absolute 0.8 (L) 1.0 - 5.1 K/uL    Monocytes Absolute 0.4 0.0 - 1.3 K/uL    Eosinophils Absolute 0.0 0.0 - 0.6 K/uL    Basophils Absolute 0.0 0.0 - 0.2 K/uL   Comprehensive Metabolic Panel w/ Reflex to MG   Result Value Ref Range    Sodium 130 (L) 136 - 145 mmol/L    Potassium reflex Magnesium 4.7 3.5 - 5.1 mmol/L    Chloride 96 (L) 99 - 110 mmol/L    CO2 20 (L) 21 - 32 mmol/L    Anion Gap 14 3 - 16    Glucose 360 (H) 70 - 99 mg/dL    BUN 19 7 - 20 mg/dL    CREATININE 0.9 0.9 - 1.3 mg/dL    GFR Non-African American >60 >60    GFR African American >60 >60    Calcium 9.1 8.3 - 10.6 mg/dL    Total Protein 7.3 6.4 - 8.2 g/dL    Alb 3.9 3.4 - 5.0 g/dL    Albumin/Globulin Ratio 1.1 1.1 - 2.2    Total Bilirubin 0.5 0.0 - 1.0 mg/dL    Alkaline Phosphatase 89 40 - 129 U/L    ALT 37 10 - 40 U/L    AST 29 15 - 37 U/L    Globulin 3.4 g/dL   Protime-INR   Result Value Ref Range    Protime 11.5 10.0 - 13.2 sec    INR 0.99 0.86 - 1.14   APTT   Result Value Ref Range    aPTT 31.5 24.2 - 36.2 sec   Fibrinogen   Result Value Ref Range    Fibrinogen 764 (H) 200 - 397 mg/dL   D-Dimer, Quantitative   Result Value Ref Range    D-Dimer, Quant 215 0 - 229 ng/mL DDU   POCT Glucose   Result Value Ref Range    POC Glucose 329 (H) 70 - 99 mg/dl    Performed on ACCU-CHEK    POCT Glucose   Result Value Ref Range    POC Glucose 531 (H) 70 - 99 mg/dl    Performed on ACCU-CHEK    POCT Glucose   Result Value Ref Range    POC Glucose 430 (H) 70 - 99 mg/dl    Performed on ACCU-CHEK    POCT Glucose   Result Value Ref Range    POC Glucose 370 (H) 70 - 99 mg/dl    Performed on ACCU-CHEK    POCT Glucose   Result Value Ref Range    POC obtaining history, conducting a physical exam, performing and monitoring interventions, ordering, collecting and interpreting tests, and establishing medical decision-making. There was a potential for life/limb threatening pathology requiring close evaluation and intervention with concern for patient decompensation. Clinical Impression:  1. COVID-19    2. Urinary tract infection without hematuria, site unspecified    3. New onset type 2 diabetes mellitus ([facility] [address]    4.  Solitary kidney, acquired      [facility]if applicable):    [facility]   Schedule an appointment as soon as possible for a visit in 2 weeks      Disposition medications (if applicable):  Current Discharge Medication List      START taking these medications    Details   benzonatate (TESSALON) 200 MG capsule Take 1 capsule by mouth 3 times daily as needed for Cough  Qty: 21 capsule, Refills: 0      ciprofloxacin (CIPRO) 500 MG tablet Take 1 tablet by mouth 2 times daily for 5 days  Qty: 10 tablet, Refills: 0      insulin glargine (LANTUS SOLOSTAR) 100 UNIT/ML injection pen Inject 5 Units into the skin nightly  Qty: 5 pen, Refills: 3      metFORMIN (GLUCOPHAGE) 500 MG tablet Take 1 tablet by mouth 2 times daily (with meals)  Qty: 60 tablet, Refills: 1      Cotton Balls MISC Check blood glucose level each morning before breakfast  Qty: 100 each, Refills: 0      Lancets MISC Check glucose each morning before breakfast  Qty: 100 each, Refills: 1      blood glucose monitor strips Check blood glucose level each morning before breakfast  Qty: 100 strip, Refills: 1      Insulin Pen Needle 31G X 5 MM MISC 1 each by Does not apply route daily  Qty: 100 each, Refills: 3      Blood Glucose Monitoring Suppl (AGAMATRIX PRESTO) w/Device KIT 1 each by Does not apply route daily (with breakfast)  Qty: 1 kit, Refills: 0             Comment: Please note this report has been produced using speech recognition software and may contain errors related to that system including errors in grammar, punctuation, and spelling, as well as words and phrases that may be inappropriate. Efforts were made to edit the dictations.      Patrick Christopher MD  [id number]/[id number]

## 2020-11-04 NOTE — PROGRESS NOTES
Pt arrived into 4253 from ED. Pt A&O x4. VSS, RA. Fall precautions in place. Call light in reach.  Electronically signed by Tia Parham RN on 11/4/2020 at 4:44 PM

## 2020-11-04 NOTE — ED TRIAGE NOTES
Patient to ED via private vehicle for generalized body aches, dry cough, nausea/diarrhea, and headache. Symptoms started on 10/26, patient was at [facility] where Joshua came down with COVID-19. \" Also, states when he takes a deep breath, he feels SOB and has to cough. 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 VS noted and stable. Patient A&Ox4. Respirations easy and unlabored. Skin warm and dry and appropriate for ethnicity. No acute distress noted at this time. Patient placed on continuous telemetry and pulse ox upon arrival to room.

## 2020-11-05 VITALS
TEMPERATURE: 97.8 F | HEART RATE: 77 BPM | SYSTOLIC BLOOD PRESSURE: 148 MMHG | HEIGHT: 73 IN | BODY MASS INDEX: 33.4 KG/M2 | DIASTOLIC BLOOD PRESSURE: 83 MMHG | RESPIRATION RATE: 18 BRPM | WEIGHT: 251.99 LBS | OXYGEN SATURATION: 94 %

## 2020-11-05 LAB
A/G RATIO: 1.1 (ref 1.1–2.2)
ALBUMIN SERPL-MCNC: 3.9 G/DL (ref 3.4–5)
ALP BLD-CCNC: 89 U/L (ref 40–129)
ALT SERPL-CCNC: 37 U/L (ref 10–40)
ANION GAP SERPL CALCULATED.3IONS-SCNC: 14 MMOL/L (ref 3–16)
AST SERPL-CCNC: 29 U/L (ref 15–37)
BASOPHILS ABSOLUTE: 0 K/UL (ref 0–0.2)
BASOPHILS RELATIVE PERCENT: 0.1 %
BILIRUB SERPL-MCNC: 0.5 MG/DL (ref 0–1)
BUN BLDV-MCNC: 19 MG/DL (ref 7–20)
CALCIUM SERPL-MCNC: 9.1 MG/DL (ref 8.3–10.6)
CHLORIDE BLD-SCNC: 96 MMOL/L (ref 99–110)
CO2: 20 MMOL/L (ref 21–32)
CREAT SERPL-MCNC: 0.9 MG/DL (ref 0.9–1.3)
EOSINOPHILS ABSOLUTE: 0 K/UL (ref 0–0.6)
EOSINOPHILS RELATIVE PERCENT: 0 %
ESTIMATED AVERAGE GLUCOSE: 231.7 MG/DL
GFR AFRICAN AMERICAN: >60
GFR NON-AFRICAN AMERICAN: >60
GLOBULIN: 3.4 G/DL
GLUCOSE BLD-MCNC: 350 MG/DL (ref 70–99)
GLUCOSE BLD-MCNC: 360 MG/DL (ref 70–99)
GLUCOSE BLD-MCNC: 370 MG/DL (ref 70–99)
HBA1C MFR BLD: 9.7 %
HCT VFR BLD CALC: 40.7 % (ref 40.5–52.5)
HEMOGLOBIN: 14.2 G/DL (ref 13.5–17.5)
LYMPHOCYTES ABSOLUTE: 0.8 K/UL (ref 1–5.1)
LYMPHOCYTES RELATIVE PERCENT: 10.7 %
MCH RBC QN AUTO: 31.6 PG (ref 26–34)
MCHC RBC AUTO-ENTMCNC: 34.8 G/DL (ref 31–36)
MCV RBC AUTO: 90.8 FL (ref 80–100)
MONOCYTES ABSOLUTE: 0.4 K/UL (ref 0–1.3)
MONOCYTES RELATIVE PERCENT: 5.5 %
NEUTROPHILS ABSOLUTE: 5.9 K/UL (ref 1.7–7.7)
NEUTROPHILS RELATIVE PERCENT: 83.7 %
PDW BLD-RTO: 13.7 % (ref 12.4–15.4)
PERFORMED ON: ABNORMAL
PERFORMED ON: ABNORMAL
PLATELET # BLD: 115 K/UL (ref 135–450)
PMV BLD AUTO: 9.9 FL (ref 5–10.5)
POTASSIUM REFLEX MAGNESIUM: 4.7 MMOL/L (ref 3.5–5.1)
RBC # BLD: 4.48 M/UL (ref 4.2–5.9)
SODIUM BLD-SCNC: 130 MMOL/L (ref 136–145)
TOTAL PROTEIN: 7.3 G/DL (ref 6.4–8.2)
WBC # BLD: 7 K/UL (ref 4–11)

## 2020-11-05 PROCEDURE — 87070 CULTURE OTHR SPECIMN AEROBIC: CPT

## 2020-11-05 PROCEDURE — 6370000000 HC RX 637 (ALT 250 FOR IP): Performed by: INTERNAL MEDICINE

## 2020-11-05 PROCEDURE — 87205 SMEAR GRAM STAIN: CPT

## 2020-11-05 PROCEDURE — 80053 COMPREHEN METABOLIC PANEL: CPT

## 2020-11-05 PROCEDURE — 87077 CULTURE AEROBIC IDENTIFY: CPT

## 2020-11-05 PROCEDURE — 6360000002 HC RX W HCPCS: Performed by: INTERNAL MEDICINE

## 2020-11-05 PROCEDURE — 2500000003 HC RX 250 WO HCPCS: Performed by: INTERNAL MEDICINE

## 2020-11-05 PROCEDURE — 87505 NFCT AGENT DETECTION GI: CPT

## 2020-11-05 PROCEDURE — 85025 COMPLETE CBC W/AUTO DIFF WBC: CPT

## 2020-11-05 PROCEDURE — 2580000003 HC RX 258: Performed by: INTERNAL MEDICINE

## 2020-11-05 PROCEDURE — 36415 COLL VENOUS BLD VENIPUNCTURE: CPT

## 2020-11-05 RX ORDER — INSULIN GLARGINE 100 [IU]/ML
8 INJECTION, SOLUTION SUBCUTANEOUS NIGHTLY
Status: DISCONTINUED | OUTPATIENT
Start: 2020-11-05 | End: 2020-11-05 | Stop reason: HOSPADM

## 2020-11-05 RX ORDER — CIPROFLOXACIN 500 MG/1
500 TABLET, FILM COATED ORAL 2 TIMES DAILY
Qty: 10 TABLET | Refills: 0 | Status: SHIPPED | OUTPATIENT
Start: 2020-11-05 | End: 2020-11-10

## 2020-11-05 RX ORDER — LANCETS 30 GAUGE
EACH MISCELLANEOUS
Qty: 100 EACH | Refills: 1 | Status: SHIPPED | OUTPATIENT
Start: 2020-11-05

## 2020-11-05 RX ORDER — GLUCOSAMINE HCL/CHONDROITIN SU 500-400 MG
CAPSULE ORAL
Qty: 100 STRIP | Refills: 1 | Status: SHIPPED | OUTPATIENT
Start: 2020-11-05

## 2020-11-05 RX ORDER — INSULIN GLARGINE 100 [IU]/ML
5 INJECTION, SOLUTION SUBCUTANEOUS NIGHTLY
Qty: 5 PEN | Refills: 3 | Status: SHIPPED | OUTPATIENT
Start: 2020-11-05

## 2020-11-05 RX ORDER — IBUPROFEN 200 MG
TABLET ORAL
Qty: 100 EACH | Refills: 0 | Status: SHIPPED | OUTPATIENT
Start: 2020-11-05

## 2020-11-05 RX ORDER — BENZONATATE 200 MG/1
200 CAPSULE ORAL 3 TIMES DAILY PRN
Qty: 21 CAPSULE | Refills: 0 | Status: SHIPPED | OUTPATIENT
Start: 2020-11-05 | End: 2020-11-12

## 2020-11-05 RX ORDER — BLOOD-GLUCOSE METER
1 EACH MISCELLANEOUS
Qty: 1 KIT | Refills: 0 | Status: SHIPPED | OUTPATIENT
Start: 2020-11-05

## 2020-11-05 RX ADMIN — INSULIN GLARGINE 8 UNITS: 100 INJECTION, SOLUTION SUBCUTANEOUS at 12:35

## 2020-11-05 RX ADMIN — ENOXAPARIN SODIUM 30 MG: 30 INJECTION SUBCUTANEOUS at 08:22

## 2020-11-05 RX ADMIN — INSULIN LISPRO 10 UNITS: 100 INJECTION, SOLUTION INTRAVENOUS; SUBCUTANEOUS at 08:43

## 2020-11-05 RX ADMIN — OXYCODONE HYDROCHLORIDE AND ACETAMINOPHEN 1000 MG: 500 TABLET ORAL at 08:22

## 2020-11-05 RX ADMIN — CEFTRIAXONE 1 G: 1 INJECTION, POWDER, FOR SOLUTION INTRAMUSCULAR; INTRAVENOUS at 12:35

## 2020-11-05 RX ADMIN — INSULIN LISPRO 10 UNITS: 100 INJECTION, SOLUTION INTRAVENOUS; SUBCUTANEOUS at 12:35

## 2020-11-05 RX ADMIN — FAMOTIDINE 10 MG: 10 INJECTION, SOLUTION INTRAVENOUS at 08:22

## 2020-11-05 RX ADMIN — SODIUM CHLORIDE: 9 INJECTION, SOLUTION INTRAVENOUS at 08:21

## 2020-11-05 RX ADMIN — ACETAMINOPHEN 650 MG: 325 TABLET ORAL at 05:45

## 2020-11-05 RX ADMIN — ZINC SULFATE 220 MG (50 MG) CAPSULE 50 MG: CAPSULE at 08:22

## 2020-11-05 ASSESSMENT — PAIN DESCRIPTION - PAIN TYPE: TYPE: ACUTE PAIN

## 2020-11-05 ASSESSMENT — PAIN DESCRIPTION - LOCATION: LOCATION: HEAD

## 2020-11-05 ASSESSMENT — PAIN SCALES - GENERAL
PAINLEVEL_OUTOF10: 2
PAINLEVEL_OUTOF10: 5

## 2020-11-05 ASSESSMENT — PAIN DESCRIPTION - DESCRIPTORS: DESCRIPTORS: ACHING

## 2020-11-05 NOTE — FLOWSHEET NOTE
Instructed that sputum and stool samples needed. Hat placed in toilet and sterile cup given for sputum. Patient verbalized understanding.

## 2020-11-05 NOTE — PROGRESS NOTES
Physician Progress Note      Ck Garibay  CSN #:                  [id number]  :                       1975  ADMIT DATE:       2020 9:02 AM  [address] [place] DATE:  RESPONDING  PROVIDER #:        Jessica Guzman MD          QUERY TEXT:    Dear Dr. Luigi Prader,  Pt admitted with COVID-19 and noted to have Pneumonia, Low WBC, hyperglycemia   with no Hx DM, new onset DM, elevated CRP, increased resp rate  If possible,   please document in progress notes and discharge summary if you are evaluating   and/or treating: The medical record reflects the following:  Risk Factors: Hx HTN, Nephrectomy as child, noted multiple sick contacts  Clinical Indicators: Presents to ED 9-day history of shortness of breath and a   productive cough. Also reports nausea, vomiting, diarrhea, fevers and   myalgias. Covid+, Resp=25/min, NFK=264.2, WBC=3.1, CXR-Left-sided airspace   disease suspicious for pneumonia, Glucose=396, also UA+ and noted to have UTI  Treatment: IVF, Lantus, Sliding scale, Rocephin  Thank you,  Nick Helm RN, CDS  @[employer]  Options provided:  -- Sepsis due to COVID-19  -- COVID-19 without sepsis  -- Other - I will add my own diagnosis  -- Disagree - Not applicable / Not valid  -- Disagree - Clinically unable to determine / Unknown  -- Refer to Clinical Documentation Reviewer    PROVIDER RESPONSE TEXT:    This patient has COVID-19 without sepsis. Query created by:  [id number]  on 2020 10:56 AM      Electronically signed by:  Jessica Guzman MD 2020 11:18 AM

## 2020-11-05 NOTE — PROGRESS NOTES
Clinical Pharmacy Note  Medication Counseling    Reviewed new medications started during hospital admission: Tessalon, Cipro, Lantus, Metformin Indications and side effects were emphasized during counseling. All medication-related questions addressed. His wife is a nurse and will assist hime with insulin injections     Should the patient express any additional questions or concerns regarding their medications, please do not hesitate to contact the pharmacy department. González Frank has been in the [facility] in the past. Plans to re-establish with them due to new dx of Diabetes. Understood the importance of this. Patient/caregiver aware they may refuse medications during hospital stay. 15 minutes spent educating patient regarding medications.     Erica Canales, [facility]

## 2020-11-05 NOTE — DISCHARGE SUMMARY
Hospitalist Discharge Summary     Robert Law  : 1975  MRN: [id number]    Admit date: 2020  Discharge date: 2020    Admitting Physician: Lucy Smith MD    Discharge Diagnoses:   Patient Active Problem List   Diagnosis    COVID-19 virus infection    New onset type 2 diabetes mellitus ([facility] [address]    Acute cystitis without hematuria    Nausea & vomiting    Diarrhea    Cough    Headache    Solitary kidney, acquired    Shortness of breath       Admission Condition: fair    Discharged Condition: stable    Discharge Exam:  VITALS:  BP (!) 148/83   Pulse 77   Temp 97.8 °F (36.6 °C)   Resp 18   Ht 6' 1\" (1.854 m)   Wt 251 lb 15.8 oz (114.3 kg)   SpO2 94%   BMI 33.25 kg/m²   CONSTITUTIONAL:  awake, alert, cooperative, no apparent distress, and appears stated age  EYES:  Lids and lashes normal, PERRL, EOMI, sclera clear, conjunctiva normal  ENT:  NC/AT, MMM    NECK:  Supple, symmetrical, trachea midline, no adenopathy  HEMATOLOGIC/LYMPHATICS:  no cervical, supraclavicular or axillary lymphadenopathy  LUNGS:  clear to auscultation bilaterally, No increased work of breathing, good air exchange, no crackles or wheezing  CARDIOVASCULAR:  Regular rate and rhythm, normal S1 and S2, no S3 or S4, and no significant murmurs, rubs or gallops noted. Normal apical impulse,   ABDOMEN:  Normal active bowel sounds, soft, non-tender, non-distended, no masses palpated, no organomegally  MUSCULOSKELETAL:  Full range of motion noted. NEUROLOGIC:  Awake, alert, oriented to name, place and time. Cranial nerves II-XII are grossly intact. SKIN:  normal skin color, texture, turgor for age. Hospital Course:     CHIEF COMPLAINT:  Pt c/o cough, headache, dizziness, fevers, nausea, vomiting and diarrhea  HISTORY OF PRESENT ILLNESS: Pt is an 41y.o. year-old male who is generally in good health. He has a solitary kidney due to having a kidney removed due to a solid mass as a child.   He was on a recent camping trip and states that several other people on the trip contracted COVID-19. Resents to the emergency room for evaluation following a 9-day history of shortness of breath and a productive cough. Also reports nausea, vomiting, diarrhea, fevers and myalgias. In the emergency room he was found to have COVID-19 and new onset Diabetes Mellitus. He is being admitted for further evaluation and treatment. Associated signs and symptoms do not include hemoptysis, hematochezia, constipation or urinary symptoms.       Pneumonia due to 2019 novel coronavirus  - Blood cultures x 2 NGTD  - Respiratory culture in process  - Legionella pneumophilia and Strep pneumoniae urine antigens ordered  - Procalcitonin not elevated 11/04/2020  - Decadron not indicated as patient is not hypoxic  - COVID19 dosed Lovenox given while in house  - Zinc sulfate 50 mg bid given while in house  - Vitamin D (Cholecalciferol) 2000 units daily given while in house  - Vitamin C 1000 mg tid given while in house  - Thiamine 200 mg IV daily given while in house  - Pepcid 10 mg IV bid given while in house  - Lovenox bidgiven while in house     Cough, Shortness of breath (without hypoxia) - we provided antitussive medications as needed.      Acute cystitis without significant hematuria - patient was started on Rocephin empirically, and will bhe discharged on Cipro. Urine culture and sensitivities have been ordered, and have not resulted at time of discharge. Pt instructed to follow up with his PCP as an outpatient     Non-Intractable vomiting with nausea - None today.  We provided symptomatic treatment with Zofran and/or Phenergan as needed, maintenance of fluids and electrolytes.     Diarrhea - None since admission     Headache - provided symptomatic treatment     Solitary kidney, acquired - renal function monitored and remained stable      Diabetes mellitus, [facility] - HgbA1c 11/04/2020 was 9.7, with an estimated average glucose level of 231.7. Ran higher in the Hospital due to initial dose of Decadron given in the ER. Will discharge on Lantus and Metformin. Pt instructed to keep a log of his glucose levels and report them to his PCP as his medications will likely need adjustment. The Diabetic Educator was consulted and provided initial information        Consults: none    Imaging Studies:  Xr Chest Portable    Result Date: 11/4/2020  EXAMINATION: ONE XRAY VIEW OF THE CHEST 11/4/2020 9:34 am COMPARISON: None. HISTORY: ORDERING SYSTEM PROVIDED HISTORY: sob TECHNOLOGIST PROVIDED HISTORY: Reason for exam:->sob Reason for Exam: Shortness of Breath Acuity: Acute Type of Exam: Initial FINDINGS: Ill-defined faint left mid to lower lung pulmonary disease is present. No evidence of pneumothorax or pleural effusion. No evidence of acute process of the cardiac or mediastinal structures     Left-sided airspace disease suspicious for pneumonia.        Other Significant Diagnostic Studies: As described above    Treatments: As described above    Disposition: home    Discharge Medications:     Diane Alvarado   Home Medication Instructions [id number]    Printed on:11/05/20 1213   Medication Information                      benzonatate (TESSALON) 200 MG capsule  Take 1 capsule by mouth 3 times daily as needed for Cough             blood glucose monitor strips  Check blood glucose level each morning before breakfast             ciprofloxacin (CIPRO) 500 MG tablet  Take 1 tablet by mouth 2 times daily for 5 days             Cotton Balls MISC  Check blood glucose level each morning before breakfast             insulin glargine (LANTUS SOLOSTAR) 100 UNIT/ML injection pen  Inject 5 Units into the skin nightly             Insulin Pen Needle 31G X 5 MM MISC  1 each by Does not apply route daily             Lancets MISC  Check glucose each morning before breakfast             metFORMIN (GLUCOPHAGE) 500 MG tablet  Take 1 tablet by mouth 2 times daily (with meals) 35 Minutes spent on patient evaluation, counseling and discharge planning.      Signed:  Tip Grimaldo MD  11/5/2020, 12:13 PM

## 2020-11-05 NOTE — CARE COORDINATION
[facility]  Diabetes Education   Progress Note       NAME:  Prashanth Hill  MEDICAL RECORD NUMBER:  [id number]  AGE: 41 y.o. GENDER: male  : 1975  TODAY'S DATE:  2020    Subjective   Reason for Diabetic Education Evaluation and Assessment: new onset diabetes    Telephonic diabetes education session. Vidhya Ngo is suprised by the diabetes diagnosis but he is receptive to using insulin at discharge. Visit Type: evaluation      Prashanth Hill is a 41 y.o. male referred by:     [x] Physician  [] Nursing  [] Chart Review   [] Other:     PAST MEDICAL HISTORY        Diagnosis Date    Hypertension        PAST SURGICAL HISTORY    Past Surgical History:   Procedure Laterality Date    TURP         FAMILY HISTORY    History reviewed. No pertinent family history.     SOCIAL HISTORY    Social History     Tobacco Use    Smoking status: Never Smoker    Smokeless tobacco: Current User     Types: Chew   Substance Use Topics    Alcohol use: Yes     Comment: once/month    Drug use: Not on file       ALLERGIES    No Known Allergies    MEDICATIONS     insulin glargine  8 Units Subcutaneous Nightly    cefTRIAXone (ROCEPHIN) IV  1 g Intravenous Q24H    sodium chloride flush  10 mL Intravenous 2 times per day    insulin lispro  0-12 Units Subcutaneous TID WC    insulin lispro  0-6 Units Subcutaneous Nightly    enoxaparin  30 mg Subcutaneous BID    famotidine (PEPCID) injection  10 mg Intravenous BID    vitamin C  1,000 mg Oral TID    zinc sulfate  50 mg Oral Daily    thiamine (VITAMIN B1) IVPB  200 mg Intravenous Q24H       Objective        Patient Active Problem List   Diagnosis Code    COVID-19 virus infection U07.1    New onset type 2 diabetes mellitus ([facility] [facility] E11.9    Acute cystitis without hematuria N30.00    Nausea & vomiting R11.2    Diarrhea R19.7    Cough R05    Headache R51.9    Solitary kidney, acquired Z90.5    Shortness of breath R06.02        BP (!) 141/87   Pulse 80 Temp 98.4 °F (36.9 °C) (Oral)   Resp 18   Ht 6' 1\" (1.854 m)   Wt 251 lb 15.8 oz (114.3 kg)   SpO2 96%   BMI 33.25 kg/m²     HgBA1c:    Lab Results   Component Value Date    LABA1C 9.7 11/04/2020       Recent Labs     11/04/20  1418 11/04/20  1626 11/04/20  2210 11/05/20  0737   POCGLU 329* 531* 430* 370*       BUN/Creatinine:    Lab Results   Component Value Date    BUN 19 11/05/2020    CREATININE 0.9 11/05/2020       Assessment        Diabetes Management and Education    Does the patient have a Primary Care Physician? No - [facility]. Does the patient require new medication instruction? Yes    Person responsible for administration of Insulin/Medication:        [x] Self     [] Caregiver       [] Spouse       [] Other Family Member   []  Other    Insulin Instruction:  insulin pen  Injection Site:   [x] location    [x] rotation     Level of patient/caregiver understanding able to:       [] Verbalized Understanding   [] Demonstrated Understanding       [] Teach Back       [x] Needs Reinforcement     []  Other:        Does the patient/caregiver monitor Blood Glucoses? No:     Does the patient/caregiver follow a Meal Plan? No: Previous experience with low carb diet. States has been eating Halloween candy. Drinks mostly water. Recommend making water, unsweetened tea or coffee primary drinks. Reviewed importance of eating three meals per day. Target a max of 60 grams of carb per meal.      Level of patient/caregiver understanding able to:       [x] Verbalized Understanding   [] Demonstrated Understanding       [] Teach Back       [] Needs Reinforcement     []  Other:                     Does the patient/caregiver understand S/S of Hyperglycemia? Yes    Reviewed symptoms, prevention and treatment.     Level of patient/caregiver understanding able to:        [x] Verbalized Understanding   [] Demonstrated Understanding       [] Teach Back       [] Needs Reinforcement     []  Other: Plan        Ongoing diabetes education and blood glucose monitoring.         The following educational and support materials were provided:  · My contact information  · Nutrition in the [address]   · ADA booklet - Living Well with Diabetes        · The Diabetes Education Program:  Planning Healthy Meals   · Academy of Nutrition and Dietetics handout - Carbohydrate Counting for People with Diabetes  · Blood Glucose Log Book                                               Teaching Time Diabetes Education:  20 minutes     Electronically signed by Keon Piedra on 11/5/2020 at 10:05 AM

## 2020-11-05 NOTE — PROGRESS NOTES
CLINICAL PHARMACY NOTE: MEDS  [employer] Select Patient?: No  Total # of Prescriptions Filled: 9   The following medications were delivered to the patient:  Current Discharge Medication List      START taking these medications    Details   benzonatate (TESSALON) 200 MG capsule Take 1 capsule by mouth 3 times daily as needed for Cough  Qty: 21 capsule, Refills: 0      ciprofloxacin (CIPRO) 500 MG tablet Take 1 tablet by mouth 2 times daily for 5 days  Qty: 10 tablet, Refills: 0      insulin glargine (LANTUS SOLOSTAR) 100 UNIT/ML injection pen Inject 5 Units into the skin nightly  Qty: 5 pen, Refills: 3      metFORMIN (GLUCOPHAGE) 500 MG tablet Take 1 tablet by mouth 2 times daily (with meals)  Qty: 60 tablet, Refills: 1      Cotton Balls MISC Check blood glucose level each morning before breakfast  Qty: 100 each, Refills: 0      Lancets MISC Check glucose each morning before breakfast  Qty: 100 each, Refills: 1      blood glucose monitor strips Check blood glucose level each morning before breakfast  Qty: 100 strip, Refills: 1      Insulin Pen Needle 31G X 5 MM MISC 1 each by Does not apply route daily  Qty: 100 each, Refills: 3      Blood Glucose Monitoring Suppl (AGAMATRIX PRESTO) w/Device KIT 1 each by Does not apply route daily (with breakfast)  Qty: 1 kit, Refills: 0         ·   ·   Total # of Interventions Completed: 2  Time Spent (min): 60    Additional Documentation:

## 2020-11-05 NOTE — FLOWSHEET NOTE
Discharge instructions and medications reviewed with patient. Pt. Verbalized understanding. Denies questions. Discontinued IV without complications. Pt. tolerated well. Medications provided to patient by retail pharmacy. Pt ambulated off unit. Wife to drive patient home.   Electronically signed by Krystle Merlos RN on 11/5/2020 at 2:09 PM

## 2020-11-05 NOTE — FLOWSHEET NOTE
Educated patient on COVID precautions at discharge.   Electronically signed by Neel Crump RN on 11/5/2020 at 2:10 PM

## 2020-11-05 NOTE — CARE COORDINATION
11/5  Call placed to patient in room to discuss DC needs. Patient lives at home with his wife - plans to return home at discharge- wife will transport home.   [facility] but b/c COVID is working on acquiring a new MD from [place] -- offered [id number] [facility] PCP assist ph #--he declined     Meds to [facility] per patient request.  Electronically signed by Katie Simons on 11/5/2020 at 9:42 AM

## 2020-11-05 NOTE — FLOWSHEET NOTE
Medicated for headache and cough with tylenol and pearls. Drinking plenty of water. Ate [employer] for dinner because dietary did not bring a dinner tray, aware of new diabetic diagnosis and diet. No other needs at this time.

## 2020-11-05 NOTE — PLAN OF CARE
Problem: Airway Clearance - Ineffective  Goal: Achieve or maintain patent airway  Outcome: Completed     Problem: Gas Exchange - Impaired  Goal: Absence of hypoxia  11/5/2020 1406 by Agnieszka Taveras RN  Outcome: Completed  11/5/2020 0120 by Nurys Vargas RN  Outcome: Ongoing  Goal: Promote optimal lung function  11/5/2020 1406 by Agnieszka Taveras RN  Outcome: Completed  11/5/2020 0120 by Nurys Vargas RN  Outcome: Ongoing     Problem: Breathing Pattern - Ineffective  Goal: Ability to achieve and maintain a regular respiratory rate  11/5/2020 1406 by Agnieszka Taveras RN  Outcome: Completed  11/5/2020 0120 by Nurys Vargas RN  Outcome: Ongoing     Problem:  Body Temperature -  Risk of, Imbalanced  Goal: Ability to maintain a body temperature within defined limits  11/5/2020 1406 by Agnieszka Taveras RN  Outcome: Completed  11/5/2020 0120 by Nurys Vargas RN  Outcome: Ongoing  Goal: Will regain or maintain usual level of consciousness  Outcome: Completed  Goal: Complications related to the disease process, condition or treatment will be avoided or minimized  11/5/2020 1406 by Agnieszka Taveras RN  Outcome: Completed  11/5/2020 0120 by Nurys Vargas RN  Outcome: Ongoing     Problem: Isolation Precautions - Risk of Spread of Infection  Goal: Prevent transmission of infection  11/5/2020 1406 by Agnieszka Taveras RN  Outcome: Completed  11/5/2020 0120 by Nurys Vargas RN  Outcome: Ongoing     Problem: Nutrition Deficits  Goal: Optimize nutrtional status  11/5/2020 1406 by Agnieszka Taveras RN  Outcome: Completed  11/5/2020 0120 by Nurys Vargas RN  Outcome: Ongoing     Problem: Risk for Fluid Volume Deficit  Goal: Maintain normal heart rhythm  11/5/2020 1406 by Agnieszka Taveras RN  Outcome: Completed  11/5/2020 0120 by Nurys Vargas RN  Outcome: Ongoing  Goal: Maintain absence of muscle cramping  Outcome: Completed  Goal: Maintain normal serum potassium, sodium, calcium, phosphorus, and pH  Outcome: Completed     Problem: Loneliness or Risk for Loneliness  Goal: Demonstrate positive use of time alone when socialization is not possible  Outcome: Completed     Problem: Fatigue  Goal: Verbalize increase energy and improved vitality  11/5/2020 1406 by Constanza Abdi RN  Outcome: Completed  11/5/2020 0120 by Misa Segura RN  Outcome: Ongoing     Problem: Patient Education: Go to Patient Education Activity  Goal: Patient/Family Education  Outcome: Completed     Problem: Discharge Planning:  Goal: Discharged to appropriate level of care  11/5/2020 1406 by Constanza Abdi RN  Outcome: Completed  11/5/2020 0120 by Misa Segura RN  Outcome: Ongoing     Problem: Serum Glucose Level - Abnormal:  Goal: Ability to maintain appropriate glucose levels will improve  11/5/2020 1406 by Constanza Abdi RN  Outcome: Completed  11/5/2020 0120 by Misa Segura RN  Outcome: Ongoing

## 2020-11-07 LAB
CULTURE, RESPIRATORY: NORMAL
GI BACTERIAL PATHOGENS BY PCR: NORMAL
GRAM STAIN RESULT: NORMAL

## 2020-11-08 LAB
BLOOD CULTURE, ROUTINE: NORMAL
CULTURE, BLOOD 2: NORMAL

## 2020-12-04 PROBLEM — R05.9 COUGH: Status: RESOLVED | Noted: 2020-11-04 | Resolved: 2020-12-04
